# Patient Record
Sex: MALE | Race: WHITE | NOT HISPANIC OR LATINO
[De-identification: names, ages, dates, MRNs, and addresses within clinical notes are randomized per-mention and may not be internally consistent; named-entity substitution may affect disease eponyms.]

---

## 2017-03-17 ENCOUNTER — APPOINTMENT (OUTPATIENT)
Dept: VASCULAR SURGERY | Facility: CLINIC | Age: 77
End: 2017-03-17

## 2017-03-17 VITALS — OXYGEN SATURATION: 94 % | DIASTOLIC BLOOD PRESSURE: 70 MMHG | SYSTOLIC BLOOD PRESSURE: 138 MMHG | HEART RATE: 67 BPM

## 2017-09-15 ENCOUNTER — APPOINTMENT (OUTPATIENT)
Dept: VASCULAR SURGERY | Facility: CLINIC | Age: 77
End: 2017-09-15
Payer: MEDICARE

## 2017-09-15 PROCEDURE — 99213 OFFICE O/P EST LOW 20 MIN: CPT | Mod: 25

## 2017-09-15 PROCEDURE — 93880 EXTRACRANIAL BILAT STUDY: CPT

## 2018-03-16 ENCOUNTER — APPOINTMENT (OUTPATIENT)
Dept: VASCULAR SURGERY | Facility: CLINIC | Age: 78
End: 2018-03-16
Payer: MEDICARE

## 2018-03-16 PROCEDURE — 99213 OFFICE O/P EST LOW 20 MIN: CPT

## 2018-03-16 PROCEDURE — 93880 EXTRACRANIAL BILAT STUDY: CPT

## 2018-06-15 ENCOUNTER — APPOINTMENT (OUTPATIENT)
Dept: VASCULAR SURGERY | Facility: CLINIC | Age: 78
End: 2018-06-15
Payer: MEDICARE

## 2018-06-15 PROCEDURE — 93880 EXTRACRANIAL BILAT STUDY: CPT

## 2018-06-15 PROCEDURE — 99213 OFFICE O/P EST LOW 20 MIN: CPT

## 2018-09-14 ENCOUNTER — APPOINTMENT (OUTPATIENT)
Dept: VASCULAR SURGERY | Facility: CLINIC | Age: 78
End: 2018-09-14
Payer: MEDICARE

## 2018-09-14 PROCEDURE — 99214 OFFICE O/P EST MOD 30 MIN: CPT

## 2018-09-14 PROCEDURE — 93880 EXTRACRANIAL BILAT STUDY: CPT

## 2019-03-15 ENCOUNTER — APPOINTMENT (OUTPATIENT)
Dept: VASCULAR SURGERY | Facility: CLINIC | Age: 79
End: 2019-03-15
Payer: MEDICARE

## 2019-03-15 PROCEDURE — 93880 EXTRACRANIAL BILAT STUDY: CPT

## 2019-03-15 PROCEDURE — 99214 OFFICE O/P EST MOD 30 MIN: CPT

## 2019-03-16 NOTE — PHYSICAL EXAM
[Carotid Bruits] : carotid bruit  [Normal Breath Sounds] : Normal breath sounds [Normal Heart Sounds] : normal heart sounds [Left Carotid Bruit] : left carotid bruit heard [2+] : left 2+ [JVD] : no jugular venous distention  [de-identified] : WN/WD, NAD [de-identified] : NC/AT

## 2019-03-16 NOTE — REVIEW OF SYSTEMS
[As Noted in HPI] : as noted in HPI [Negative] : Heme/Lymph [Dizziness] : no dizziness [Fainting] : no fainting

## 2019-03-16 NOTE — ASSESSMENT
[Carotid Endarterectomy] : carotid endarterectomy [FreeTextEntry1] : 79 yo M s/p  Right carotid stent several years ago comes in for 6 months \par Pt remains asymptomatic, no neurologic deficits.\par Carotid doppler demonstrated R ICA patent stent and 99% complex plaque in prox. left ICA\par Patient was recommended L CEA in the next few weeks.\par He will see PCP and a cardiologist for pre-op clearance.

## 2019-03-16 NOTE — HISTORY OF PRESENT ILLNESS
[FreeTextEntry1] : 79 yo M s/p  Right carotid stent several years ago comes in for 6 months follow up since L ICA stenosis is getting closer to 70%.\par Patient is doing well, denies dizziness, lightheadedness, syncope. He states that his cholesterol is better controlled.  Walks without claudication.\par \par

## 2019-03-29 ENCOUNTER — LABORATORY RESULT (OUTPATIENT)
Age: 79
End: 2019-03-29

## 2019-03-29 LAB
ALBUMIN SERPL ELPH-MCNC: 4.5 G/DL
ALP BLD-CCNC: 60 U/L
ALT SERPL-CCNC: 20 U/L
ANION GAP SERPL CALC-SCNC: 11 MMOL/L
AST SERPL-CCNC: 18 U/L
BASOPHILS # BLD AUTO: 0.05 K/UL
BASOPHILS NFR BLD AUTO: 0.8 %
BILIRUB SERPL-MCNC: 0.4 MG/DL
BUN SERPL-MCNC: 20 MG/DL
CALCIUM SERPL-MCNC: 9.4 MG/DL
CHLORIDE SERPL-SCNC: 100 MMOL/L
CO2 SERPL-SCNC: 28 MMOL/L
CREAT SERPL-MCNC: 1.16 MG/DL
EOSINOPHIL # BLD AUTO: 0.75 K/UL
EOSINOPHIL NFR BLD AUTO: 12.3 %
GLUCOSE SERPL-MCNC: 123 MG/DL
HCT VFR BLD CALC: 47.3 %
HGB BLD-MCNC: 14.4 G/DL
IMM GRANULOCYTES NFR BLD AUTO: 0.3 %
INR PPP: 0.97 RATIO
LYMPHOCYTES # BLD AUTO: 1.52 K/UL
LYMPHOCYTES NFR BLD AUTO: 25 %
MAN DIFF?: NORMAL
MCHC RBC-ENTMCNC: 29.2 PG
MCHC RBC-ENTMCNC: 30.4 GM/DL
MCV RBC AUTO: 95.9 FL
MONOCYTES # BLD AUTO: 0.65 K/UL
MONOCYTES NFR BLD AUTO: 10.7 %
NEUTROPHILS # BLD AUTO: 3.09 K/UL
NEUTROPHILS NFR BLD AUTO: 50.9 %
PLATELET # BLD AUTO: 155 K/UL
POTASSIUM SERPL-SCNC: 4.5 MMOL/L
PROT SERPL-MCNC: 7 G/DL
PT BLD: 10.9 SEC
RBC # BLD: 4.93 M/UL
RBC # FLD: 13.4 %
SODIUM SERPL-SCNC: 139 MMOL/L
WBC # FLD AUTO: 6.08 K/UL

## 2019-04-10 VITALS
TEMPERATURE: 97 F | DIASTOLIC BLOOD PRESSURE: 72 MMHG | HEART RATE: 65 BPM | HEIGHT: 64 IN | RESPIRATION RATE: 18 BRPM | SYSTOLIC BLOOD PRESSURE: 156 MMHG | OXYGEN SATURATION: 100 % | WEIGHT: 138.23 LBS

## 2019-04-11 ENCOUNTER — INPATIENT (INPATIENT)
Facility: HOSPITAL | Age: 79
LOS: 0 days | Discharge: HOME | DRG: 68 | End: 2019-04-11
Attending: SURGERY | Admitting: SURGERY
Payer: MEDICARE

## 2019-04-11 ENCOUNTER — TRANSCRIPTION ENCOUNTER (OUTPATIENT)
Age: 79
End: 2019-04-11

## 2019-04-11 VITALS — TEMPERATURE: 98 F

## 2019-04-11 DIAGNOSIS — Z98.890 OTHER SPECIFIED POSTPROCEDURAL STATES: Chronic | ICD-10-CM

## 2019-04-11 LAB
ALBUMIN SERPL ELPH-MCNC: 3.8 G/DL — SIGNIFICANT CHANGE UP (ref 3.3–5)
ALP SERPL-CCNC: 49 U/L — SIGNIFICANT CHANGE UP (ref 40–120)
ALT FLD-CCNC: 17 U/L — SIGNIFICANT CHANGE UP (ref 10–45)
ANION GAP SERPL CALC-SCNC: 13 MMOL/L — SIGNIFICANT CHANGE UP (ref 5–17)
APTT BLD: 26.9 SEC — LOW (ref 27.5–36.3)
AST SERPL-CCNC: 15 U/L — SIGNIFICANT CHANGE UP (ref 10–40)
BILIRUB SERPL-MCNC: 0.6 MG/DL — SIGNIFICANT CHANGE UP (ref 0.2–1.2)
BUN SERPL-MCNC: 15 MG/DL — SIGNIFICANT CHANGE UP (ref 7–23)
CALCIUM SERPL-MCNC: 8.8 MG/DL — SIGNIFICANT CHANGE UP (ref 8.4–10.5)
CHLORIDE SERPL-SCNC: 105 MMOL/L — SIGNIFICANT CHANGE UP (ref 96–108)
CO2 SERPL-SCNC: 20 MMOL/L — LOW (ref 22–31)
CREAT SERPL-MCNC: 0.9 MG/DL — SIGNIFICANT CHANGE UP (ref 0.5–1.3)
GLUCOSE BLDC GLUCOMTR-MCNC: 136 MG/DL — HIGH (ref 70–99)
GLUCOSE BLDC GLUCOMTR-MCNC: 157 MG/DL — HIGH (ref 70–99)
GLUCOSE BLDC GLUCOMTR-MCNC: 193 MG/DL — HIGH (ref 70–99)
GLUCOSE SERPL-MCNC: 185 MG/DL — HIGH (ref 70–99)
HCT VFR BLD CALC: 40.7 % — SIGNIFICANT CHANGE UP (ref 39–50)
HGB BLD-MCNC: 13.2 G/DL — SIGNIFICANT CHANGE UP (ref 13–17)
INR BLD: 1.04 — SIGNIFICANT CHANGE UP (ref 0.88–1.16)
MAGNESIUM SERPL-MCNC: 1.8 MG/DL — SIGNIFICANT CHANGE UP (ref 1.6–2.6)
MCHC RBC-ENTMCNC: 30.1 PG — SIGNIFICANT CHANGE UP (ref 27–34)
MCHC RBC-ENTMCNC: 32.4 GM/DL — SIGNIFICANT CHANGE UP (ref 32–36)
MCV RBC AUTO: 92.7 FL — SIGNIFICANT CHANGE UP (ref 80–100)
NRBC # BLD: 0 /100 WBCS — SIGNIFICANT CHANGE UP (ref 0–0)
PHOSPHATE SERPL-MCNC: 2.7 MG/DL — SIGNIFICANT CHANGE UP (ref 2.5–4.5)
PLATELET # BLD AUTO: 135 K/UL — LOW (ref 150–400)
POTASSIUM SERPL-MCNC: 3.6 MMOL/L — SIGNIFICANT CHANGE UP (ref 3.5–5.3)
POTASSIUM SERPL-SCNC: 3.6 MMOL/L — SIGNIFICANT CHANGE UP (ref 3.5–5.3)
PROT SERPL-MCNC: 6.6 G/DL — SIGNIFICANT CHANGE UP (ref 6–8.3)
PROTHROM AB SERPL-ACNC: 11.8 SEC — SIGNIFICANT CHANGE UP (ref 10–12.9)
RBC # BLD: 4.39 M/UL — SIGNIFICANT CHANGE UP (ref 4.2–5.8)
RBC # FLD: 13.2 % — SIGNIFICANT CHANGE UP (ref 10.3–14.5)
SODIUM SERPL-SCNC: 138 MMOL/L — SIGNIFICANT CHANGE UP (ref 135–145)
WBC # BLD: 6.4 K/UL — SIGNIFICANT CHANGE UP (ref 3.8–10.5)
WBC # FLD AUTO: 6.4 K/UL — SIGNIFICANT CHANGE UP (ref 3.8–10.5)

## 2019-04-11 PROCEDURE — 70450 CT HEAD/BRAIN W/O DYE: CPT | Mod: 26

## 2019-04-11 PROCEDURE — 99223 1ST HOSP IP/OBS HIGH 75: CPT

## 2019-04-11 PROCEDURE — 35301 RECHANNELING OF ARTERY: CPT | Mod: GC,53

## 2019-04-11 RX ORDER — DEXTROSE 50 % IN WATER 50 %
12.5 SYRINGE (ML) INTRAVENOUS ONCE
Qty: 0 | Refills: 0 | Status: DISCONTINUED | OUTPATIENT
Start: 2019-04-11 | End: 2019-04-11

## 2019-04-11 RX ORDER — SODIUM CHLORIDE 9 MG/ML
1000 INJECTION, SOLUTION INTRAVENOUS
Qty: 0 | Refills: 0 | Status: DISCONTINUED | OUTPATIENT
Start: 2019-04-11 | End: 2019-04-11

## 2019-04-11 RX ORDER — DEXTROSE 50 % IN WATER 50 %
25 SYRINGE (ML) INTRAVENOUS ONCE
Qty: 0 | Refills: 0 | Status: DISCONTINUED | OUTPATIENT
Start: 2019-04-11 | End: 2019-04-11

## 2019-04-11 RX ORDER — INSULIN LISPRO 100/ML
VIAL (ML) SUBCUTANEOUS EVERY 6 HOURS
Qty: 0 | Refills: 0 | Status: DISCONTINUED | OUTPATIENT
Start: 2019-04-11 | End: 2019-04-11

## 2019-04-11 RX ORDER — HEPARIN SODIUM 5000 [USP'U]/ML
5000 INJECTION INTRAVENOUS; SUBCUTANEOUS EVERY 8 HOURS
Qty: 0 | Refills: 0 | Status: DISCONTINUED | OUTPATIENT
Start: 2019-04-11 | End: 2019-04-11

## 2019-04-11 RX ORDER — ASPIRIN/CALCIUM CARB/MAGNESIUM 324 MG
1 TABLET ORAL
Qty: 30 | Refills: 0 | OUTPATIENT
Start: 2019-04-11 | End: 2019-05-10

## 2019-04-11 RX ADMIN — Medication 2: at 18:00

## 2019-04-11 RX ADMIN — Medication 2: at 18:01

## 2019-04-11 NOTE — CHART NOTE - NSCHARTNOTEFT_GEN_A_CORE
Pt underwent general anesthetic induction with propofol and rocuronium for endotracheal intubation with continuous cerebral monitoring for an elective left carotid endarterectomy.  Pt received ephedrine after induction for sbp in the 90s. While prepping the patient's neck he acutely dropped his SBP to 40s with HR in the 20s. 0.5mg of epinephrine was administered IV. Cerebral monitoring was absent for 20 seconds. Within 2 minutes his bradycardia and hypotension resolved and had a palpable femoral pulse. Chest compressions were not initiated. Pt woke up and was extubated, A&Ox3 and neurologically intact. Due to the significant hypotension and bradycardia with significant carotid stenosis a stroke code will be called.

## 2019-04-11 NOTE — H&P PST ADULT - ASSESSMENT
78M with a hx of HTN, HLD, DM, and b/l carotid stenosis s/p R CEA in 2009 c/b recurrence s/p carotid angioplasty and stent placement in 2012 now presenting to [facility] with asymptomatic severe L carotid stenosis (70-99%) for an elective left carotid endarterectomy.    - Will admit to the vascular surgical service post op, telemetry bed.  - [id number] in the PACU  - CLD this evening and probable advancement of diet on POD1  - Maintain -170  - Post op labs and EKG

## 2019-04-11 NOTE — DISCHARGE NOTE PROVIDER - CARE PROVIDER_API CALL
Mala Durand)  Surgery; Vascular Surgery  [address] [place] [address]  Phone: [phone]  Fax: [phone]  Follow Up Time:

## 2019-04-11 NOTE — DISCHARGE NOTE PROVIDER - NSDCFUADDAPPT_GEN_ALL_CORE_FT
Please follow up with Dr. Durand. Call his office to make a followup appointment. Please follow up with Dr. Durand. Call his office to reschedule surgery.

## 2019-04-11 NOTE — CONSULT NOTE ADULT - SUBJECTIVE AND OBJECTIVE BOX
**STROKE CODE CONSULT NOTE**    Last known well time/Time of onset of symptoms: 11:30 am    HPI:  79 yo M w/ PMH HLD, DM, right carotid stenosis s/p CEA s/p stent, coming in for elective CEA of left carotid. Pt was in the OR and received propofol and rocuronium, when the team noted that the pt's blood pressure went down to 40s, and his heart rate went down to the 20s. He was immediately taken off those meds and given epinephrine, and came back fully awake. Stroke code was called.    On arrival, pt was completely back to normal. Pt had no neurological deficits. He had no complaints - no headache, dizziness, weakness, or numbness. Stated he felt a little tired.     CT head done with no acute injury.     PAST MEDICAL & SURGICAL HISTORY:  HLD (hyperlipidemia)  DM (diabetes mellitus)  Carotid artery stenosis  History of right-sided carotid endarterectomy      FAMILY HISTORY:      SOCIAL HISTORY:  Denies smoking, drinking, or drug use    ROS:  Constitutional: No fever, weight loss or fatigue  Eyes: No eye pain, visual disturbances, or discharge  ENMT:  No difficulty hearing, tinnitus, vertigo; No sinus or throat pain  Neck: No pain or stiffness  Respiratory: No cough, wheezing, chills or hemoptysis  Cardiovascular: No chest pain, palpitations, shortness of breath, dizziness or leg swelling  Gastrointestinal: No abdominal pain. No nausea, vomiting or hematemesis; No diarrhea or constipation. Nohematochezia.  Genitourinary: No dysuria, frequency, hematuria or incontinence  Neurological: As per HPI      MEDICATIONS  (STANDING):  dextrose 50% Injectable 12.5 Gram(s) IV Push once  dextrose 50% Injectable 25 Gram(s) IV Push once  dextrose 50% Injectable 25 Gram(s) IV Push once  insulin lispro (HumaLOG) corrective regimen sliding scale   SubCutaneous every 6 hours  lactated ringers. 1000 milliLiter(s) (120 mL/Hr) IV Continuous <Continuous>    MEDICATIONS  (PRN):      Allergies    No Known Allergies    Intolerances        Vital Signs Last 24 Hrs  T(C): 35.8 (11 Apr 2019 12:06), Max: 35.8 (11 Apr 2019 12:06)  T(F): 96.5 (11 Apr 2019 12:06), Max: 96.5 (11 Apr 2019 12:06)  HR: 83 (11 Apr 2019 14:01) (74 - 84)  BP: 140/71 (11 Apr 2019 14:01) (109/51 - 141/68)  BP(mean): 102 (11 Apr 2019 14:01) (72 - 102)  RR: 18 (11 Apr 2019 14:01) (11 - 21)  SpO2: 100% (11 Apr 2019 14:01) (100% - 100%)    PHYSICAL EXAM:  Constitutional: WDWN; NAD    Neurologic:  -Mental status: Awake, alert and oriented to person, place, and time. Speech is fluent with intact naming.  Recent and remote memory intact.  Attention/concentration intact.  No dysarthria, no aphasia.  Fund of knowledge appropriate.    -Cranial nerves:  II: Visual fields full to confrontation. Pupils PERRL  III, IV, VI: extraocular movements are intact without nystagmus  V: Facial sensation intact V1 through V3 intact bilaterally  VII: Face is symmetric with normal eye closure and smile, no facial droop.  VIII: hearing is intact to finger rub  IX, X: uvula is midline and soft palate rises symmetrically  XI: Head turning and shoulder shrug intact  XII: Tongue protrudes in the midline    -Motor:  Normal bulk and tone, strength 5/5 in bilateral upper and lower extremities.   strength 5/5.    -Sensation: Intact to light touch.  No neglect.   -Coordination: No dysmetria on finger-to-nose and heel-to-shin.  No clumsiness.  -Reflexes:downgoing toes bilaterally  -Gait: deferred    NIHSS: 0    Fingerstick Blood Glucose: CAPILLARY BLOOD GLUCOSE      POCT Blood Glucose.: 157 mg/dL (11 Apr 2019 12:05)       LABS:                        13.2   6.40  )-----------( 135      ( 11 Apr 2019 12:23 )             40.7     04-11    138  |  105  |  15  ----------------------------<  185<H>  3.6   |  20<L>  |  0.90    Ca    8.8      11 Apr 2019 12:23  Phos  2.7     04-11  Mg     1.8     04-11    TPro  6.6  /  Alb  3.8  /  TBili  0.6  /  DBili  x   /  AST  15  /  ALT  17  /  AlkPhos  49  04-11    PT/INR - ( 11 Apr 2019 12:23 )   PT: 11.8 sec;   INR: 1.04          PTT - ( 11 Apr 2019 12:23 )  PTT:26.9 sec          RADIOLOGY & ADDITIONAL STUDIES:  < from: CT Brain Stroke Protocol (04.11.19 @ 12:25) >    IMPRESSION:    No acute findings.    < end of copied text >        IV-tPA (Y/N):         N                          Reason IV-tPA not given: no deficit    ASSESSMENT/PLAN:    78y Male w/ PMH coming in for left CEA, procedure stopped and stroke code called for low blood pressure and heart rate.    -CT negative for any acute injury  -No neurological deficit  -Consider other cardiac workup  -Care per vascular team

## 2019-04-11 NOTE — DISCHARGE NOTE PROVIDER - HOSPITAL COURSE
78M with a hx of HTN, HLD, DM, and b/l carotid stenosis s/p R CEA in 2009 c/b recurrence s/p carotid angioplasty and stent placement in 2012 now presenting to [facility] with asymptomatic severe L carotid stenosis (70-99%) for an elective left carotid endarterectomy.      Pt underwent general anesthetic induction with propofol and rocuronium for endotracheal intubation with continuous cerebral monitoring for an elective left carotid endarterectomy.  Pt received ephedrine after induction for sbp in the 90s. While prepping the patient's neck he acutely dropped his SBP to 40s with HR in the 20s. 0.5mg of epinephrine was administered IV. Cerebral monitoring was absent for 20 seconds. Within 2 minutes his bradycardia and hypotension resolved and had a palpable femoral pulse. Chest compressions were not initiated. Pt woke up and was extubated, A&Ox3 and neurologically intact. Due to the significant hypotension and bradycardia with significant carotid stenosis a stroke code will be called.      On arrival, pt was completely back to normal. Pt had no neurological deficits. He had no complaints - no headache, dizziness, weakness, or numbness. Stated he felt a little tired.     CT head done with no acute injury.         Patients recovery in PACU was unremarkable.     On day of discharge, pt deemed stable and ready to return home with plan to follow up as an outpatient. 78M with a hx of HTN, HLD, DM, and b/l carotid stenosis s/p R CEA in 2009 c/b recurrence s/p carotid angioplasty and stent placement in 2012 now presenting to [facility] with asymptomatic severe L carotid stenosis (70-99%) for an elective left carotid endarterectomy.      Pt underwent general anesthetic induction with propofol and rocuronium for endotracheal intubation with continuous cerebral monitoring for an elective left carotid endarterectomy.  Pt received ephedrine after induction for sbp in the 90s. While prepping the patient's neck he acutely dropped his SBP to 40s with HR in the 20s. 0.5mg of epinephrine was administered IV. Cerebral monitoring was absent for 20 seconds. Within 2 minutes his bradycardia and hypotension resolved and had a palpable femoral pulse. Chest compressions were not initiated. Pt woke up and was extubated, A&Ox3 and neurologically intact. Due to the significant hypotension and bradycardia with significant carotid stenosis a stroke code will be called.      On arrival, pt was completely back to normal. Pt had no neurological deficits. He had no complaints - no headache, dizziness, weakness, or numbness. Stated he felt a little tired.     CT head done with no acute injury.         Patients recovery in PACU was unremarkable.     On day of discharge, pt deemed stable and ready to return home with plan to reschedule planned surgery.

## 2019-04-11 NOTE — PROGRESS NOTE ADULT - SUBJECTIVE AND OBJECTIVE BOX
POST-OP CHECK:    Procedure:  L CEA aborted due to hypotensive and bradycardic episode, stroke code called and pt found negative for stroke  Surgeon: Dr. Silva    S: Reports some drousiness likely from anesthesia. Denies N, V, CP, SOB, & calf tenderness.     O:   Vital Signs Last 24 Hrs  T(C): 35.9 (11 Apr 2019 14:01), Max: 35.9 (11 Apr 2019 14:01)  T(F): 96.7 (11 Apr 2019 14:01), Max: 96.7 (11 Apr 2019 14:01)  HR: 83 (11 Apr 2019 16:13) (74 - 88)  BP: 137/64 (11 Apr 2019 16:13) (109/51 - 141/68)  BP(mean): 88 (11 Apr 2019 16:00) (72 - 102)  RR: 24 (11 Apr 2019 16:13) (11 - 24)  SpO2: 96% (11 Apr 2019 16:13) (96% - 100%)    Gen: NAD; A&Ox3; resting comfortably in bed.  Neuro: no focal deficits  CV: RRR.   Resp: CTAB; no increased WOB.   Abd: Soft, ND, NT.  Extr: WWP; no edema; SCDs in place.    A/P: 78yMale s/p above procedure.  -CLD, ADAT; IVF until tolerating adequate PO  -SQH, SCDs, OOB/A, IS  -Further cardiac workup for bradycardic episode

## 2019-04-11 NOTE — DISCHARGE NOTE NURSING/CASE MANAGEMENT/SOCIAL WORK - NSDCDPATPORTLINK_GEN_ALL_CORE
You can access the [facility] Patient Portal, offered by [facility], by registering with the following website: [email or web address]

## 2019-04-11 NOTE — H&P PST ADULT - HISTORY OF PRESENT ILLNESS
78M with a hx of HTN, HLD, DM, and b/l carotid stenosis s/p R CEA in 2009 c/b recurrence s/p carotid angioplasty and stent placement in 2012 now presenting to [facility] with asymptomatic severe L carotid stenosis (70-99%) for an elective left carotid endarterectomy.  Denies any recent chest pain, sob, claudication, recent hospitalizations, weakness or sensory deficit.    PMH: HTN, HLD, DM, b/l carotid stenosis.  Meds: Simvastatin, ramipril, pioglitazone  PSH: R CEA and R carotid angioplasty/stent  NKDA  Never smoker.

## 2019-04-11 NOTE — SWALLOW BEDSIDE ASSESSMENT ADULT - ASR SWALLOW ASPIRATION MONITOR
cough/oral hygiene/fever/pneumonia/throat clearing/upper respiratory infection/change of breathing pattern/position upright (88Y)/gurgly voice

## 2019-04-15 PROBLEM — I65.29 OCCLUSION AND STENOSIS OF UNSPECIFIED CAROTID ARTERY: Chronic | Status: ACTIVE | Noted: 2019-04-10

## 2019-04-15 PROBLEM — E11.9 TYPE 2 DIABETES MELLITUS WITHOUT COMPLICATIONS: Chronic | Status: ACTIVE | Noted: 2019-04-10

## 2019-04-15 PROBLEM — E78.5 HYPERLIPIDEMIA, UNSPECIFIED: Chronic | Status: ACTIVE | Noted: 2019-04-10

## 2019-04-16 DIAGNOSIS — R00.1 BRADYCARDIA, UNSPECIFIED: ICD-10-CM

## 2019-04-16 DIAGNOSIS — E11.9 TYPE 2 DIABETES MELLITUS WITHOUT COMPLICATIONS: ICD-10-CM

## 2019-04-16 DIAGNOSIS — I65.22 OCCLUSION AND STENOSIS OF LEFT CAROTID ARTERY: ICD-10-CM

## 2019-04-16 DIAGNOSIS — I95.9 HYPOTENSION, UNSPECIFIED: ICD-10-CM

## 2019-04-16 DIAGNOSIS — I10 ESSENTIAL (PRIMARY) HYPERTENSION: ICD-10-CM

## 2019-04-16 DIAGNOSIS — E78.5 HYPERLIPIDEMIA, UNSPECIFIED: ICD-10-CM

## 2019-04-18 PROCEDURE — 85730 THROMBOPLASTIN TIME PARTIAL: CPT

## 2019-04-18 PROCEDURE — 85610 PROTHROMBIN TIME: CPT

## 2019-04-18 PROCEDURE — 36415 COLL VENOUS BLD VENIPUNCTURE: CPT

## 2019-04-18 PROCEDURE — 70450 CT HEAD/BRAIN W/O DYE: CPT

## 2019-04-18 PROCEDURE — 85027 COMPLETE CBC AUTOMATED: CPT

## 2019-04-18 PROCEDURE — 84100 ASSAY OF PHOSPHORUS: CPT

## 2019-04-18 PROCEDURE — 80053 COMPREHEN METABOLIC PANEL: CPT

## 2019-04-18 PROCEDURE — 82962 GLUCOSE BLOOD TEST: CPT

## 2019-04-18 PROCEDURE — 83735 ASSAY OF MAGNESIUM: CPT

## 2019-04-23 ENCOUNTER — APPOINTMENT (OUTPATIENT)
Dept: VASCULAR SURGERY | Facility: HOSPITAL | Age: 79
End: 2019-04-23

## 2019-04-23 ENCOUNTER — RESULT REVIEW (OUTPATIENT)
Age: 79
End: 2019-04-23

## 2019-04-23 ENCOUNTER — INPATIENT (INPATIENT)
Facility: HOSPITAL | Age: 79
LOS: 0 days | Discharge: HOME | DRG: 39 | End: 2019-04-24
Attending: SURGERY | Admitting: SURGERY
Payer: MEDICARE

## 2019-04-23 VITALS
DIASTOLIC BLOOD PRESSURE: 69 MMHG | TEMPERATURE: 97 F | OXYGEN SATURATION: 99 % | HEIGHT: 64 IN | HEART RATE: 66 BPM | SYSTOLIC BLOOD PRESSURE: 142 MMHG | WEIGHT: 136.25 LBS | RESPIRATION RATE: 16 BRPM

## 2019-04-23 DIAGNOSIS — Z98.890 OTHER SPECIFIED POSTPROCEDURAL STATES: Chronic | ICD-10-CM

## 2019-04-23 LAB
ANION GAP SERPL CALC-SCNC: 12 MMOL/L — SIGNIFICANT CHANGE UP (ref 5–17)
APTT BLD: 146.9 SEC — CRITICAL HIGH (ref 27.5–36.3)
BASE EXCESS BLDA CALC-SCNC: -0.2 MMOL/L — SIGNIFICANT CHANGE UP (ref -2–3)
BASOPHILS # BLD AUTO: 0.02 K/UL — SIGNIFICANT CHANGE UP (ref 0–0.2)
BASOPHILS NFR BLD AUTO: 0.3 % — SIGNIFICANT CHANGE UP (ref 0–2)
BUN SERPL-MCNC: 13 MG/DL — SIGNIFICANT CHANGE UP (ref 7–23)
CA-I BLDA-SCNC: 1.11 MMOL/L — LOW (ref 1.12–1.3)
CALCIUM SERPL-MCNC: 8.9 MG/DL — SIGNIFICANT CHANGE UP (ref 8.4–10.5)
CHLORIDE SERPL-SCNC: 101 MMOL/L — SIGNIFICANT CHANGE UP (ref 96–108)
CO2 SERPL-SCNC: 23 MMOL/L — SIGNIFICANT CHANGE UP (ref 22–31)
COHGB MFR BLDA: 0.6 % — SIGNIFICANT CHANGE UP
CREAT SERPL-MCNC: 0.96 MG/DL — SIGNIFICANT CHANGE UP (ref 0.5–1.3)
EOSINOPHIL # BLD AUTO: 0.3 K/UL — SIGNIFICANT CHANGE UP (ref 0–0.5)
EOSINOPHIL NFR BLD AUTO: 4.2 % — SIGNIFICANT CHANGE UP (ref 0–6)
GLUCOSE BLDC GLUCOMTR-MCNC: 112 MG/DL — HIGH (ref 70–99)
GLUCOSE BLDC GLUCOMTR-MCNC: 114 MG/DL — HIGH (ref 70–99)
GLUCOSE BLDC GLUCOMTR-MCNC: 144 MG/DL — HIGH (ref 70–99)
GLUCOSE BLDC GLUCOMTR-MCNC: 200 MG/DL — HIGH (ref 70–99)
GLUCOSE BLDC GLUCOMTR-MCNC: 230 MG/DL — HIGH (ref 70–99)
GLUCOSE SERPL-MCNC: 169 MG/DL — HIGH (ref 70–99)
HCO3 BLDA-SCNC: 24 MMOL/L — SIGNIFICANT CHANGE UP (ref 21–28)
HCT VFR BLD CALC: 40 % — SIGNIFICANT CHANGE UP (ref 39–50)
HGB BLD-MCNC: 12.8 G/DL — LOW (ref 13–17)
HGB BLDA-MCNC: 13.3 G/DL — SIGNIFICANT CHANGE UP (ref 13–17)
IMM GRANULOCYTES NFR BLD AUTO: 0.6 % — SIGNIFICANT CHANGE UP (ref 0–1.5)
INR BLD: 1.13 — SIGNIFICANT CHANGE UP (ref 0.88–1.16)
LYMPHOCYTES # BLD AUTO: 0.98 K/UL — LOW (ref 1–3.3)
LYMPHOCYTES # BLD AUTO: 13.8 % — SIGNIFICANT CHANGE UP (ref 13–44)
MAGNESIUM SERPL-MCNC: 1.8 MG/DL — SIGNIFICANT CHANGE UP (ref 1.6–2.6)
MCHC RBC-ENTMCNC: 29.4 PG — SIGNIFICANT CHANGE UP (ref 27–34)
MCHC RBC-ENTMCNC: 32 GM/DL — SIGNIFICANT CHANGE UP (ref 32–36)
MCV RBC AUTO: 92 FL — SIGNIFICANT CHANGE UP (ref 80–100)
METHGB MFR BLDA: 0.5 % — SIGNIFICANT CHANGE UP
MONOCYTES # BLD AUTO: 0.23 K/UL — SIGNIFICANT CHANGE UP (ref 0–0.9)
MONOCYTES NFR BLD AUTO: 3.2 % — SIGNIFICANT CHANGE UP (ref 2–14)
NEUTROPHILS # BLD AUTO: 5.54 K/UL — SIGNIFICANT CHANGE UP (ref 1.8–7.4)
NEUTROPHILS NFR BLD AUTO: 77.9 % — HIGH (ref 43–77)
NRBC # BLD: 0 /100 WBCS — SIGNIFICANT CHANGE UP (ref 0–0)
O2 CT VFR BLDA CALC: 19.5 ML/DL — SIGNIFICANT CHANGE UP (ref 15–23)
OXYHGB MFR BLDA: 99 % — SIGNIFICANT CHANGE UP (ref 94–100)
PCO2 BLDA: 38 MMHG — SIGNIFICANT CHANGE UP (ref 35–48)
PH BLDA: 7.42 — SIGNIFICANT CHANGE UP (ref 7.35–7.45)
PHOSPHATE SERPL-MCNC: 2.2 MG/DL — LOW (ref 2.5–4.5)
PLATELET # BLD AUTO: 146 K/UL — LOW (ref 150–400)
PO2 BLDA: 420 MMHG — HIGH (ref 83–108)
POTASSIUM BLDA-SCNC: 3.9 MMOL/L — SIGNIFICANT CHANGE UP (ref 3.5–4.9)
POTASSIUM SERPL-MCNC: 3.8 MMOL/L — SIGNIFICANT CHANGE UP (ref 3.5–5.3)
POTASSIUM SERPL-SCNC: 3.8 MMOL/L — SIGNIFICANT CHANGE UP (ref 3.5–5.3)
PROTHROM AB SERPL-ACNC: 12.8 SEC — SIGNIFICANT CHANGE UP (ref 10–12.9)
RBC # BLD: 4.35 M/UL — SIGNIFICANT CHANGE UP (ref 4.2–5.8)
RBC # FLD: 13.4 % — SIGNIFICANT CHANGE UP (ref 10.3–14.5)
SAO2 % BLDA: 100 % — SIGNIFICANT CHANGE UP (ref 95–100)
SODIUM BLDA-SCNC: 136 MMOL/L — LOW (ref 138–146)
SODIUM SERPL-SCNC: 136 MMOL/L — SIGNIFICANT CHANGE UP (ref 135–145)
WBC # BLD: 7.11 K/UL — SIGNIFICANT CHANGE UP (ref 3.8–10.5)
WBC # FLD AUTO: 7.11 K/UL — SIGNIFICANT CHANGE UP (ref 3.8–10.5)

## 2019-04-23 PROCEDURE — 35301 RECHANNELING OF ARTERY: CPT | Mod: 58,GC

## 2019-04-23 RX ORDER — PIOGLITAZONE HYDROCHLORIDE 15 MG/1
1 TABLET ORAL
Qty: 0 | Refills: 0 | COMMUNITY

## 2019-04-23 RX ORDER — ACETAMINOPHEN 500 MG
650 TABLET ORAL EVERY 6 HOURS
Qty: 0 | Refills: 0 | Status: DISCONTINUED | OUTPATIENT
Start: 2019-04-23 | End: 2019-04-24

## 2019-04-23 RX ORDER — DEXTROSE 50 % IN WATER 50 %
15 SYRINGE (ML) INTRAVENOUS ONCE
Qty: 0 | Refills: 0 | Status: DISCONTINUED | OUTPATIENT
Start: 2019-04-23 | End: 2019-04-24

## 2019-04-23 RX ORDER — DEXTROSE 50 % IN WATER 50 %
12.5 SYRINGE (ML) INTRAVENOUS ONCE
Qty: 0 | Refills: 0 | Status: DISCONTINUED | OUTPATIENT
Start: 2019-04-23 | End: 2019-04-24

## 2019-04-23 RX ORDER — CEFAZOLIN SODIUM 1 G
2000 VIAL (EA) INJECTION EVERY 8 HOURS
Qty: 0 | Refills: 0 | Status: DISCONTINUED | OUTPATIENT
Start: 2019-04-23 | End: 2019-04-23

## 2019-04-23 RX ORDER — DEXTROSE 50 % IN WATER 50 %
25 SYRINGE (ML) INTRAVENOUS ONCE
Qty: 0 | Refills: 0 | Status: DISCONTINUED | OUTPATIENT
Start: 2019-04-23 | End: 2019-04-24

## 2019-04-23 RX ORDER — ASPIRIN/CALCIUM CARB/MAGNESIUM 324 MG
81 TABLET ORAL DAILY
Qty: 0 | Refills: 0 | Status: DISCONTINUED | OUTPATIENT
Start: 2019-04-24 | End: 2019-04-24

## 2019-04-23 RX ORDER — RAMIPRIL 5 MG
1 CAPSULE ORAL
Qty: 0 | Refills: 0 | COMMUNITY

## 2019-04-23 RX ORDER — CEFAZOLIN SODIUM 1 G
2000 VIAL (EA) INJECTION EVERY 8 HOURS
Qty: 0 | Refills: 0 | Status: COMPLETED | OUTPATIENT
Start: 2019-04-23 | End: 2019-04-24

## 2019-04-23 RX ORDER — SIMVASTATIN 20 MG/1
1 TABLET, FILM COATED ORAL
Qty: 0 | Refills: 0 | COMMUNITY

## 2019-04-23 RX ORDER — SODIUM CHLORIDE 9 MG/ML
1000 INJECTION, SOLUTION INTRAVENOUS
Qty: 0 | Refills: 0 | Status: DISCONTINUED | OUTPATIENT
Start: 2019-04-23 | End: 2019-04-24

## 2019-04-23 RX ORDER — SODIUM CHLORIDE 9 MG/ML
1000 INJECTION INTRAMUSCULAR; INTRAVENOUS; SUBCUTANEOUS
Qty: 0 | Refills: 0 | Status: DISCONTINUED | OUTPATIENT
Start: 2019-04-23 | End: 2019-04-24

## 2019-04-23 RX ORDER — INSULIN LISPRO 100/ML
VIAL (ML) SUBCUTANEOUS
Qty: 0 | Refills: 0 | Status: DISCONTINUED | OUTPATIENT
Start: 2019-04-23 | End: 2019-04-24

## 2019-04-23 RX ORDER — SIMVASTATIN 20 MG/1
10 TABLET, FILM COATED ORAL AT BEDTIME
Qty: 0 | Refills: 0 | Status: DISCONTINUED | OUTPATIENT
Start: 2019-04-23 | End: 2019-04-24

## 2019-04-23 RX ORDER — GLUCAGON INJECTION, SOLUTION 0.5 MG/.1ML
1 INJECTION, SOLUTION SUBCUTANEOUS ONCE
Qty: 0 | Refills: 0 | Status: DISCONTINUED | OUTPATIENT
Start: 2019-04-23 | End: 2019-04-24

## 2019-04-23 RX ADMIN — Medication 1: at 17:05

## 2019-04-23 RX ADMIN — Medication 2000 MILLIGRAM(S): at 17:06

## 2019-04-23 RX ADMIN — Medication 2: at 21:20

## 2019-04-23 RX ADMIN — SIMVASTATIN 10 MILLIGRAM(S): 20 TABLET, FILM COATED ORAL at 21:20

## 2019-04-23 RX ADMIN — Medication 650 MILLIGRAM(S): at 21:19

## 2019-04-23 RX ADMIN — SODIUM CHLORIDE 80 MILLILITER(S): 9 INJECTION INTRAMUSCULAR; INTRAVENOUS; SUBCUTANEOUS at 15:06

## 2019-04-23 RX ADMIN — Medication 650 MILLIGRAM(S): at 21:49

## 2019-04-23 NOTE — PRE-OP CHECKLIST - SELECT TESTS ORDERED
cardiac clearance/EKG/BMP/PT/PTT/INR/CBC EKG/PT/PTT/INR/cardiac clearance, /CBC/BMP/POCT Blood Glucose

## 2019-04-23 NOTE — PROGRESS NOTE ADULT - SUBJECTIVE AND OBJECTIVE BOX
POST-OPERATIVE NOTE    Procedure: Lt CEA    Diagnosis/Indication: Lt carotid stenosis    Surgeon: Kizzy    S: Pt has no complaints. Denies CP, SOB. Pain controlled with medication.    O:  T(C): 36.2 (04-23-19 @ 13:15), Max: 36.2 (04-23-19 @ 13:15)  T(F): 97.2 (04-23-19 @ 13:15), Max: 97.2 (04-23-19 @ 13:15)  HR: 88 (04-23-19 @ 13:15) (88 - 88)  BP: 145/77 (04-23-19 @ 13:15) (145/77 - 145/77)  RR: 19 (04-23-19 @ 13:15) (19 - 19)  SpO2: 96% (04-23-19 @ 13:15) (96% - 96%)  Wt(kg): --                        12.8   7.11  )-----------( 146      ( 23 Apr 2019 11:02 )             40.0     04-23    136  |  101  |  13  ----------------------------<  169<H>  3.8   |  23  |  0.96    Ca    8.9      23 Apr 2019 11:02  Phos  2.2     04-23  Mg     1.8     04-23        Gen: NAD, resting comfortably in bed  C/V: NSR  Neck: Lt drsg- c/d/i kang intact and functioning  Pulm: Nonlabored breathing, no respiratory distress  Abd: soft, NT/ND  Extrem: WWP      A/P: 78yMale s/p above procedure  Diet: CLD  IVF: ns@80  Pain/nausea control  f/u labs

## 2019-04-23 NOTE — BRIEF OPERATIVE NOTE - OPERATION/FINDINGS
Left carotid endarterectomy and Photofix patch angioplasty. Extensive soft friable plaque removed. Continuous EEG monitoring showed no changes.

## 2019-04-23 NOTE — H&P PST ADULT - HISTORY OF PRESENT ILLNESS
Attending: Kizzy    HPI: 78 M with HTN, DM, and carotid artery stenosis previously s/p R carotid stenting (3 yrs ago) and presenting today for left CEA (L ICA velocities 455/145). He is currently asymptomatic and in his normal state of health. He denies fever, headache, vision change, speech difficulty, weakness/numbness, chest pain, dyspnea, abdominal pain, N/V/D/C, dysuria, or difficulty walking.       PAST MEDICAL & SURGICAL HISTORY:  HLD (hyperlipidemia)  DM (diabetes mellitus)  Carotid artery stenosis  History of right-sided carotid endarterectomy      MEDICATIONS:  - ramipril   - simvastatin  - pioglitazone    Allergies: No Known Allergies    SOCIAL HISTORY: never smoker     FAMILY HISTORY: non-contributory     REVIEW OF SYSTEMS: negative as per HPI      Vital Signs Last 24 Hrs  T(C): 36.2 (23 Apr 2019 07:05), Max: 36.2 (23 Apr 2019 07:05)  T(F): 97.1 (23 Apr 2019 07:05), Max: 97.1 (23 Apr 2019 07:05)  HR: 66 (23 Apr 2019 07:05) (66 - 66)  BP: 142/69 (23 Apr 2019 07:05) (142/69 - 142/69)  BP(mean): --  RR: 16 (23 Apr 2019 07:05) (16 - 16)  SpO2: 99% (23 Apr 2019 07:05) (99% - 99%)        Physical Exam:  - NAD, alert, interactive, appears well  - CNII-XII grossly intact, no focal deficits, 5/5 strength and intact sensation in all 4 extremities  - non-labored breathing on room air    LABS: pre-op labs reviewed    POCT Blood Glucose.: 112 mg/dL (23 Apr 2019 07:19)      RADIOLOGY & ADDITIONAL STUDIES: pre-op carotid duplex reviewed       Assessment: 78 M with asymptomatic L carotid artery stenosis presenting for L CEA    Post-operative plan:   - admit to Vascular Surgery telemetry floor  - serial neuro checks, monitor for headache   - Tylenol PRN for pain, no narcotics  - clear liquid diet post op, advance as tolerated  - insulin sliding scale  - continue home medications   - likely discharge home POD#1 if doing well

## 2019-04-24 ENCOUNTER — TRANSCRIPTION ENCOUNTER (OUTPATIENT)
Age: 79
End: 2019-04-24

## 2019-04-24 VITALS — SYSTOLIC BLOOD PRESSURE: 158 MMHG | RESPIRATION RATE: 16 BRPM | HEART RATE: 90 BPM | DIASTOLIC BLOOD PRESSURE: 58 MMHG

## 2019-04-24 LAB
ANION GAP SERPL CALC-SCNC: 8 MMOL/L — SIGNIFICANT CHANGE UP (ref 5–17)
BUN SERPL-MCNC: 10 MG/DL — SIGNIFICANT CHANGE UP (ref 7–23)
CALCIUM SERPL-MCNC: 9.2 MG/DL — SIGNIFICANT CHANGE UP (ref 8.4–10.5)
CHLORIDE SERPL-SCNC: 105 MMOL/L — SIGNIFICANT CHANGE UP (ref 96–108)
CO2 SERPL-SCNC: 27 MMOL/L — SIGNIFICANT CHANGE UP (ref 22–31)
CREAT SERPL-MCNC: 0.96 MG/DL — SIGNIFICANT CHANGE UP (ref 0.5–1.3)
GLUCOSE BLDC GLUCOMTR-MCNC: 136 MG/DL — HIGH (ref 70–99)
GLUCOSE SERPL-MCNC: 149 MG/DL — HIGH (ref 70–99)
HBA1C BLD-MCNC: 6.8 % — HIGH (ref 4–5.6)
HCT VFR BLD CALC: 40.5 % — SIGNIFICANT CHANGE UP (ref 39–50)
HGB BLD-MCNC: 13 G/DL — SIGNIFICANT CHANGE UP (ref 13–17)
MAGNESIUM SERPL-MCNC: 2 MG/DL — SIGNIFICANT CHANGE UP (ref 1.6–2.6)
MCHC RBC-ENTMCNC: 30.4 PG — SIGNIFICANT CHANGE UP (ref 27–34)
MCHC RBC-ENTMCNC: 32.1 GM/DL — SIGNIFICANT CHANGE UP (ref 32–36)
MCV RBC AUTO: 94.6 FL — SIGNIFICANT CHANGE UP (ref 80–100)
NRBC # BLD: 0 /100 WBCS — SIGNIFICANT CHANGE UP (ref 0–0)
PHOSPHATE SERPL-MCNC: 2.5 MG/DL — SIGNIFICANT CHANGE UP (ref 2.5–4.5)
PLATELET # BLD AUTO: 160 K/UL — SIGNIFICANT CHANGE UP (ref 150–400)
POTASSIUM SERPL-MCNC: 4.3 MMOL/L — SIGNIFICANT CHANGE UP (ref 3.5–5.3)
POTASSIUM SERPL-SCNC: 4.3 MMOL/L — SIGNIFICANT CHANGE UP (ref 3.5–5.3)
RBC # BLD: 4.28 M/UL — SIGNIFICANT CHANGE UP (ref 4.2–5.8)
RBC # FLD: 13.9 % — SIGNIFICANT CHANGE UP (ref 10.3–14.5)
SODIUM SERPL-SCNC: 140 MMOL/L — SIGNIFICANT CHANGE UP (ref 135–145)
WBC # BLD: 10.3 K/UL — SIGNIFICANT CHANGE UP (ref 3.8–10.5)
WBC # FLD AUTO: 10.3 K/UL — SIGNIFICANT CHANGE UP (ref 3.8–10.5)

## 2019-04-24 RX ORDER — LISINOPRIL 2.5 MG/1
20 TABLET ORAL DAILY
Qty: 0 | Refills: 0 | Status: DISCONTINUED | OUTPATIENT
Start: 2019-04-24 | End: 2019-04-24

## 2019-04-24 RX ADMIN — Medication 2000 MILLIGRAM(S): at 00:48

## 2019-04-24 NOTE — DISCHARGE NOTE PROVIDER - NSDCFUADDINST_GEN_ALL_CORE_FT
Follow-up with Dr. Durand in 1-2 weeks in office at [phone].   Wound care: Daily-Remove dressings to shower with soap and water. Dry well. Leave open to air.   Please call your doctor if you have a fever of over 101.9 or swelling/bleeding at wound. Follow-up with Dr. Durand in 1-2 weeks in office at [phone].   Wound care: Daily-Remove dressings to shower with soap and water. Dry well. Leave open to air.   Your HgbA1c test measures the glucose (blood sugar) in your blood by assessing the amount of what's called glycated hemoglobin yours is 6.7. >6.5 means you have diabetes.  Please follow up with your Primary care physician.  Ambulate: as tolerated but have someone with you at all times since you will not be able to turn your head as you would normally due to the incision.  Do NOT bathe or swim until your doctor clears you.  Please call your doctor if you have a fever of over 101.9 or swelling/bleeding at wound.

## 2019-04-24 NOTE — DISCHARGE NOTE NURSING/CASE MANAGEMENT/SOCIAL WORK - NSDCDPATPORTLINK_GEN_ALL_CORE
You can access the [facility] Patient Portal, offered by [facility], by registering with the following website: [email or web address]

## 2019-04-24 NOTE — DISCHARGE NOTE PROVIDER - CARE PROVIDER_API CALL
Mala Durand)  Surgery; Vascular Surgery  [address] [place] [address]  Phone: [phone]  Fax: [phone]  Follow Up Time:

## 2019-04-24 NOTE — PROGRESS NOTE ADULT - SUBJECTIVE AND OBJECTIVE BOX
O/N: JESSIE, ADOLFO, MORIAH 7.5/62.5                          A/P: 78yMale s/p L CEA  Diet: CLD  IVF: ns@80  Pain/nausea control  f/u labs O/N: JESSIE, VSS, KANG 7.5/62.5    aspirin enteric coated 81        Vital Signs Last 24 Hrs  T(C): 36.4 (24 Apr 2019 05:57), Max: 37.6 (24 Apr 2019 01:00)  T(F): 97.5 (24 Apr 2019 05:57), Max: 99.6 (24 Apr 2019 01:00)  HR: 79 (24 Apr 2019 05:00) (79 - 100)  BP: 139/63 (24 Apr 2019 05:00) (124/58 - 164/57)  BP(mean): 91 (24 Apr 2019 05:00) (67 - 113)  RR: 17 (24 Apr 2019 05:00) (12 - 22)  SpO2: 99% (24 Apr 2019 05:00) (96% - 100%)  I&O's Summary    23 Apr 2019 07:01  -  24 Apr 2019 07:00  --------------------------------------------------------  IN: 1380 mL / OUT: 1645 mL / NET: -265 mL        Physical Exam:  General: NAD, resting comfortably in bed  Pulmonary: Nonlabored breathing, no respiratory distress  Neck: Lt drsg- c/d/i kang intact and functioning  Extrem: WW    LABS:                        12.8   7.11  )-----------( 146      ( 23 Apr 2019 11:02 )             40.0     04-23    136  |  101  |  13  ----------------------------<  169<H>  3.8   |  23  |  0.96    Ca    8.9      23 Apr 2019 11:02  Phos  2.2     04-23  Mg     1.8     04-23      PT/INR - ( 23 Apr 2019 11:02 )   PT: 12.8 sec;   INR: 1.13          PTT - ( 23 Apr 2019 11:02 )  PTT:146.9 sec    Assessment and Plan:     A/P: 78yMale s/p L CEA  Diet: consistent carb  ISS  asa  Pain/nausea control  f/u labs

## 2019-04-24 NOTE — DISCHARGE NOTE PROVIDER - HOSPITAL COURSE
78 M with HTN, DM, and carotid artery stenosis previously s/p R carotid stenting (3 yrs ago) and presenting today for left CEA (L ICA velocities 455/145). He is currently asymptomatic and in his normal state of health. He denies fever, headache, vision change, speech difficulty, weakness/numbness, chest pain, dyspnea, abdominal pain, N/V/D/C, dysuria, or difficulty walking.     During hospital course s/p Lt CEA 4/24. His postoperative course had some bleeding around the MORIAH drain surgicel applied bleeding stopped was otherwise unremarkable with advancement of diet, passing trial of void, and pain control. On day of discharge patient was stable to be d/c'd home. 78 M with HTN, DM, and carotid artery stenosis previously s/p R carotid stenting (3 yrs ago) and presenting today for left CEA (L ICA velocities 455/145). He is currently asymptomatic and in his normal state of health. He denies fever, headache, vision change, speech difficulty, weakness/numbness, chest pain, dyspnea, abdominal pain, N/V/D/C, dysuria, or difficulty walking.     During hospital course s/p Lt CEA 4/24. His postoperative course had some bleeding around the MORIAH drain surgicel applied bleeding stopped was otherwise unremarkable with advancement of diet, passing trial of void, and pain control. On day of discharge MORIAH drain removed patient was stable to be d/c'd home.         Discharge instructions discussed with patient.

## 2019-04-24 NOTE — DISCHARGE NOTE PROVIDER - NSDCACTIVITY_GEN_ALL_CORE
Walking - Outdoors allowed/Walking - Indoors allowed/Showering allowed/Do not drive or operate machinery Walking - Indoors allowed/No heavy lifting/straining/Walking - Outdoors allowed/Do not drive or operate machinery/Showering allowed

## 2019-04-25 PROCEDURE — 83735 ASSAY OF MAGNESIUM: CPT

## 2019-04-25 PROCEDURE — 86850 RBC ANTIBODY SCREEN: CPT

## 2019-04-25 PROCEDURE — C1889: CPT

## 2019-04-25 PROCEDURE — 88304 TISSUE EXAM BY PATHOLOGIST: CPT

## 2019-04-25 PROCEDURE — 84295 ASSAY OF SERUM SODIUM: CPT

## 2019-04-25 PROCEDURE — 85025 COMPLETE CBC W/AUTO DIFF WBC: CPT

## 2019-04-25 PROCEDURE — 82330 ASSAY OF CALCIUM: CPT

## 2019-04-25 PROCEDURE — 80048 BASIC METABOLIC PNL TOTAL CA: CPT

## 2019-04-25 PROCEDURE — 85610 PROTHROMBIN TIME: CPT

## 2019-04-25 PROCEDURE — 85018 HEMOGLOBIN: CPT

## 2019-04-25 PROCEDURE — 85027 COMPLETE CBC AUTOMATED: CPT

## 2019-04-25 PROCEDURE — 84132 ASSAY OF SERUM POTASSIUM: CPT

## 2019-04-25 PROCEDURE — 86901 BLOOD TYPING SEROLOGIC RH(D): CPT

## 2019-04-25 PROCEDURE — 36415 COLL VENOUS BLD VENIPUNCTURE: CPT

## 2019-04-25 PROCEDURE — 83036 HEMOGLOBIN GLYCOSYLATED A1C: CPT

## 2019-04-25 PROCEDURE — 82962 GLUCOSE BLOOD TEST: CPT

## 2019-04-25 PROCEDURE — C9399: CPT

## 2019-04-25 PROCEDURE — 86900 BLOOD TYPING SEROLOGIC ABO: CPT

## 2019-04-25 PROCEDURE — 84100 ASSAY OF PHOSPHORUS: CPT

## 2019-04-25 PROCEDURE — 85730 THROMBOPLASTIN TIME PARTIAL: CPT

## 2019-04-30 DIAGNOSIS — Z79.82 LONG TERM (CURRENT) USE OF ASPIRIN: ICD-10-CM

## 2019-04-30 DIAGNOSIS — I65.22 OCCLUSION AND STENOSIS OF LEFT CAROTID ARTERY: ICD-10-CM

## 2019-04-30 DIAGNOSIS — E78.5 HYPERLIPIDEMIA, UNSPECIFIED: ICD-10-CM

## 2019-04-30 DIAGNOSIS — I10 ESSENTIAL (PRIMARY) HYPERTENSION: ICD-10-CM

## 2019-04-30 DIAGNOSIS — E11.51 TYPE 2 DIABETES MELLITUS WITH DIABETIC PERIPHERAL ANGIOPATHY WITHOUT GANGRENE: ICD-10-CM

## 2019-04-30 DIAGNOSIS — I25.10 ATHEROSCLEROTIC HEART DISEASE OF NATIVE CORONARY ARTERY WITHOUT ANGINA PECTORIS: ICD-10-CM

## 2019-05-07 LAB — SURGICAL PATHOLOGY STUDY: SIGNIFICANT CHANGE UP

## 2019-05-10 ENCOUNTER — APPOINTMENT (OUTPATIENT)
Dept: VASCULAR SURGERY | Facility: CLINIC | Age: 79
End: 2019-05-10
Payer: MEDICARE

## 2019-05-10 PROCEDURE — 99024 POSTOP FOLLOW-UP VISIT: CPT

## 2019-05-10 PROCEDURE — 93880 EXTRACRANIAL BILAT STUDY: CPT

## 2019-05-15 NOTE — REVIEW OF SYSTEMS
[Negative] : Heme/Lymph [Fever] : no fever [Chills] : no chills [Sore Throat] : no sore throat [Hoarseness] : no hoarseness [Dizziness] : no dizziness

## 2019-05-15 NOTE — PHYSICAL EXAM
[Normal Breath Sounds] : Normal breath sounds [Normal Heart Sounds] : normal heart sounds [2+] : left 2+ [Alert] : alert [Calm] : calm [JVD] : no jugular venous distention  [Carotid Bruits] : no carotid bruits [de-identified] : NAD [de-identified] : L side of neck: well healed incision, staples in place, no signs of infection [de-identified] : grossly intact

## 2019-05-15 NOTE — REASON FOR VISIT
[de-identified] : 79 yo M with hx of R carotid stent and left ICA high grade stenosis requiring L CEA returns for a post=op visit. Patient is doing well, denies pain, dizziness, other neurologic deficits. No fever, chills. [de-identified] : Left CEA [de-identified] : 4/23/19

## 2019-05-15 NOTE — DISCUSSION/SUMMARY
[FreeTextEntry1] : 79 yo M with hx of R carotid stent and left ICA high grade stenosis requiring L CEA returns for a post=op visit.\par Patient is doing well, no c/o's.\par Sutures removed.\par Carotid duplex today demonstrated good flow.\par F/u in 6 months.

## 2019-11-08 ENCOUNTER — APPOINTMENT (OUTPATIENT)
Dept: VASCULAR SURGERY | Facility: CLINIC | Age: 79
End: 2019-11-08
Payer: MEDICARE

## 2019-11-08 PROCEDURE — 93880 EXTRACRANIAL BILAT STUDY: CPT

## 2019-11-08 PROCEDURE — 99214 OFFICE O/P EST MOD 30 MIN: CPT

## 2019-11-08 NOTE — PHYSICAL EXAM
[Normal Breath Sounds] : Normal breath sounds [Normal Heart Sounds] : normal heart sounds [Calm] : calm [Alert] : alert [2+] : left 2+ [JVD] : no jugular venous distention  [de-identified] : NAD [Carotid Bruits] : no carotid bruits [de-identified] : grossly intact [de-identified] : L side of neck: well healed incision

## 2019-11-08 NOTE — ASSESSMENT
[Carotid Endarterectomy] : carotid endarterectomy [FreeTextEntry1] : 79 yo M with hx of R carotid stent and left ICA high grade stenosis requiring L CEA on 4/23/19 returns for a f/u visit.\par Patient is asymptomatic, no neurologic deficits.\par Carotid duplex demonstrated patent L CEA site and R ICA stent.\par F/u in 6 month.

## 2019-11-08 NOTE — HISTORY OF PRESENT ILLNESS
[FreeTextEntry1] : 79 yo M with hx of R carotid stent and left ICA high grade stenosis requiring L CEA on 4/23/19 returns for a f/u visit. Patient is doing well, denies pain, dizziness, other neurologic deficits. No fever, chills. \par

## 2020-07-20 ENCOUNTER — APPOINTMENT (OUTPATIENT)
Dept: VASCULAR SURGERY | Facility: CLINIC | Age: 80
End: 2020-07-20
Payer: MEDICARE

## 2020-07-20 PROCEDURE — 93880 EXTRACRANIAL BILAT STUDY: CPT

## 2020-07-20 PROCEDURE — 99214 OFFICE O/P EST MOD 30 MIN: CPT

## 2020-07-21 NOTE — HISTORY OF PRESENT ILLNESS
[FreeTextEntry1] : 80 yo M with hx of R carotid stent and left ICA high grade stenosis requiring L CEA on 4/23/19 returns today sine he developed dizziness, feeling nauseous, having neck pain and he wanted to be evaluated. Patient denies neurologic deficits, syncope, hx of TIA/CVA. No fever, chills. \par

## 2020-07-21 NOTE — PROCEDURE
[FreeTextEntry1] : Carotid duplex: patent L CEA site and R ICA stent, unchanged from prior study 11/8/2019

## 2020-07-21 NOTE — REVIEW OF SYSTEMS
[As Noted in HPI] : as noted in HPI [Dizziness] : dizziness [Negative] : Heme/Lymph [Chills] : no chills [Fever] : no fever [Fainting] : no fainting [FreeTextEntry7] : nausea

## 2020-07-21 NOTE — PHYSICAL EXAM
[Normal Breath Sounds] : Normal breath sounds [Normal Heart Sounds] : normal heart sounds [2+] : left 2+ [Alert] : alert [Calm] : calm [JVD] : no jugular venous distention  [de-identified] : L side of neck: well healed incision [de-identified] : NAD [Carotid Bruits] : no carotid bruits [de-identified] : grossly intact

## 2020-07-21 NOTE — ASSESSMENT
[Carotid Endarterectomy] : carotid endarterectomy [FreeTextEntry1] : 80 yo M with hx of R carotid stent and left ICA high grade stenosis requiring L CEA on 4/23/19 returns today due to acute dizziness, nausea, neck pain \par Patient denies LOC, other neurologic deficits.\par Carotid duplex demonstrated patent L CEA site and R ICA stent.\par Patient was explained that he doesn't have any vascular issues at this time responsible for his symptoms.\par He was recommended to see his cardiologist.\par F/u in 6 months.

## 2020-11-06 ENCOUNTER — APPOINTMENT (OUTPATIENT)
Dept: VASCULAR SURGERY | Facility: CLINIC | Age: 80
End: 2020-11-06
Payer: MEDICARE

## 2020-11-06 PROCEDURE — 93880 EXTRACRANIAL BILAT STUDY: CPT

## 2020-11-06 PROCEDURE — 99213 OFFICE O/P EST LOW 20 MIN: CPT

## 2020-11-09 NOTE — PHYSICAL EXAM
[Normal Breath Sounds] : Normal breath sounds [Normal Heart Sounds] : normal heart sounds [2+] : left 2+ [No Rash or Lesion] : No rash or lesion [Alert] : alert [Oriented to Person] : oriented to person [Oriented to Place] : oriented to place [Oriented to Time] : oriented to time [Calm] : calm [JVD] : no jugular venous distention  [Carotid Bruits] : no carotid bruits [Abdomen Tenderness] : ~T ~M No abdominal tenderness [de-identified] : NAD [de-identified] : R & L side of neck: well healed incisions [de-identified] : Supple [de-identified] : FROM [de-identified] : grossly intact

## 2020-11-09 NOTE — ADDENDUM
[FreeTextEntry1] : This note was written by Morelia Iqbal on 11/06/2020 acting as scribe for Mala Escobedo M.D.\par \par I, Mala Hagen have read and attest that all the information, medical decision making and discharge instructions within are true and accurate.

## 2020-11-09 NOTE — HISTORY OF PRESENT ILLNESS
[FreeTextEntry1] : 80 yo M with hx of R carotid stent and left ICA high grade stenosis s/p  L CEA on 4/23/19 returns today for f/u evaluation. Pt reports he has been feeling well overall.  Patient denies neurologic deficits, syncope, hx of TIA/CVA, no vision changes. No fever, chills. \par

## 2020-11-09 NOTE — ASSESSMENT
[FreeTextEntry1] : 78 yo M with hx of R carotid stent and left ICA high grade stenosis s/p L CEA on 4/23/19 returns today for f/u evaluation. On exam, well healed almost unnoticeable b/l carotid incisions. \par Carotid duplex demonstrated patent L CEA site and R ICA stent.\par Patient encourage to continue to stay active through daily walking, continue playing chess, stay well hydrated and contact the office in case he develops any concerning symptoms. To f/u with us here repeat scans in 1 year.

## 2020-11-09 NOTE — PROCEDURE
[FreeTextEntry1] : B/L Carotid US done at the office today demonstrates: RCA: ICA stent patent  LCA: Patent CEA

## 2020-11-09 NOTE — REVIEW OF SYSTEMS
[As Noted in HPI] : as noted in HPI [Dizziness] : dizziness [Negative] : Heme/Lymph [Fever] : no fever [Chills] : no chills [Fainting] : no fainting [FreeTextEntry7] : nausea

## 2020-11-16 ENCOUNTER — TRANSCRIPTION ENCOUNTER (OUTPATIENT)
Age: 80
End: 2020-11-16

## 2020-12-01 ENCOUNTER — TRANSCRIPTION ENCOUNTER (OUTPATIENT)
Age: 80
End: 2020-12-01

## 2021-04-06 NOTE — PRE-OP CHECKLIST - PATIENT SENT TO
Medication: atorvastatin (LIPITOR) 10 MG tablet, losartan (COZAAR) 100 MG tablet  Last office visit: 9/17/2020  Medication changes at visit: No    Last refilled: 10/8/2020   Is pharmacy confirmed?: Yes    Follow-up: None, overdue, patient needs to schedule.  Script was eprescribed to preferred pharmacy per Dr. Babcock.     operating room

## 2021-08-18 NOTE — PROCEDURE
[FreeTextEntry1] : B/L carotid doppler demonstrated R ICA patent stent and 99% complex plaque in prox. left ICA Odomzo Counseling- I discussed with the patient the risks of Odomzo including but not limited to nausea, vomiting, diarrhea, constipation, weight loss, changes in the sense of taste, decreased appetite, muscle spasms, and hair loss.  The patient verbalized understanding of the proper use and possible adverse effects of Odomzo.  All of the patient's questions and concerns were addressed.

## 2021-11-05 ENCOUNTER — APPOINTMENT (OUTPATIENT)
Dept: VASCULAR SURGERY | Facility: CLINIC | Age: 81
End: 2021-11-05
Payer: MEDICARE

## 2021-11-05 PROCEDURE — 93880 EXTRACRANIAL BILAT STUDY: CPT

## 2021-11-05 PROCEDURE — 99213 OFFICE O/P EST LOW 20 MIN: CPT

## 2021-11-05 NOTE — ADDENDUM
[FreeTextEntry1] : This note was written by Carrie Briones on 11/05/2021 acting as scribe for Mala Escobedo M.D.\par \par

## 2021-11-05 NOTE — HISTORY OF PRESENT ILLNESS
[FreeTextEntry1] : 79 yo M with hx of R carotid stent and left ICA high grade stenosis s/p  L CEA on 4/23/19 returns today for f/u evaluation. Pt reports he has been feeling well overall.  Patient denies neurologic deficits, syncope, hx of TIA/CVA, no vision changes. No fever, chills. \par

## 2021-11-05 NOTE — ASSESSMENT
[FreeTextEntry1] : 81 yo M with hx of R carotid stent and left ICA high grade stenosis s/p L CEA on 4/23/19 returns today for f/u evaluation. Patient grossly intact, well healed almost unnoticeable b/l carotid incisions. \par Carotid duplex demonstrated patent L CEA site and R ICA stent. No changes since last visit. \par Patient encourage to continue to stay active through daily walking, continue playing chess, stay well hydrated and contact the office in case he develops any concerning symptoms. To f/u with us here repeat scans in 1 year.

## 2021-11-05 NOTE — PHYSICAL EXAM
[Normal Breath Sounds] : Normal breath sounds [Normal Heart Sounds] : normal heart sounds [No Rash or Lesion] : No rash or lesion [Alert] : alert [Oriented to Person] : oriented to person [Oriented to Place] : oriented to place [Oriented to Time] : oriented to time [Calm] : calm [2+] : left 2+ [JVD] : no jugular venous distention  [Carotid Bruits] : no carotid bruits [Abdomen Tenderness] : ~T ~M No abdominal tenderness [de-identified] : NAD [de-identified] : NC/AT  [de-identified] : R & L side of neck: well healed incisions [de-identified] : FROM [de-identified] : grossly intact

## 2022-01-03 ENCOUNTER — TRANSCRIPTION ENCOUNTER (OUTPATIENT)
Age: 82
End: 2022-01-03

## 2022-01-11 ENCOUNTER — TRANSCRIPTION ENCOUNTER (OUTPATIENT)
Age: 82
End: 2022-01-11

## 2022-11-04 ENCOUNTER — APPOINTMENT (OUTPATIENT)
Dept: VASCULAR SURGERY | Facility: CLINIC | Age: 82
End: 2022-11-04

## 2023-03-22 ENCOUNTER — NON-APPOINTMENT (OUTPATIENT)
Age: 83
End: 2023-03-22

## 2023-07-14 ENCOUNTER — NON-APPOINTMENT (OUTPATIENT)
Age: 83
End: 2023-07-14

## 2023-10-24 NOTE — PATIENT PROFILE ADULT - DOES PATIENT HAVE ADVANCE DIRECTIVE
Tremaine Feliciano  Urology  [address] [place] [address], [address] [address]  Phone: [phone]  Fax: [phone]  Follow Up Time:    yes

## 2024-09-27 ENCOUNTER — NON-APPOINTMENT (OUTPATIENT)
Age: 84
End: 2024-09-27

## 2025-07-24 ENCOUNTER — NON-APPOINTMENT (OUTPATIENT)
Age: 85
End: 2025-07-24